# Patient Record
Sex: FEMALE | Race: WHITE | Employment: FULL TIME | URBAN - METROPOLITAN AREA
[De-identification: names, ages, dates, MRNs, and addresses within clinical notes are randomized per-mention and may not be internally consistent; named-entity substitution may affect disease eponyms.]

---

## 2017-06-05 ENCOUNTER — OFFICE VISIT (OUTPATIENT)
Dept: FAMILY MEDICINE CLINIC | Facility: CLINIC | Age: 37
End: 2017-06-05

## 2017-06-05 VITALS
RESPIRATION RATE: 20 BRPM | HEIGHT: 67 IN | BODY MASS INDEX: 22.26 KG/M2 | TEMPERATURE: 98.4 F | DIASTOLIC BLOOD PRESSURE: 78 MMHG | WEIGHT: 141.8 LBS | OXYGEN SATURATION: 99 % | SYSTOLIC BLOOD PRESSURE: 122 MMHG | HEART RATE: 70 BPM

## 2017-06-05 DIAGNOSIS — J02.9 SORE THROAT: ICD-10-CM

## 2017-06-05 DIAGNOSIS — J01.00 ACUTE NON-RECURRENT MAXILLARY SINUSITIS: Primary | ICD-10-CM

## 2017-06-05 LAB
S PYO AG THROAT QL: NEGATIVE
VALID INTERNAL CONTROL?: YES

## 2017-06-05 RX ORDER — AMOXICILLIN AND CLAVULANATE POTASSIUM 875; 125 MG/1; MG/1
1 TABLET, FILM COATED ORAL EVERY 12 HOURS
Qty: 14 TAB | Refills: 0 | Status: SHIPPED | OUTPATIENT
Start: 2017-06-05 | End: 2017-06-12

## 2017-06-05 RX ORDER — GUAIFENESIN 100 MG/5ML
200 SOLUTION ORAL
COMMUNITY

## 2017-06-05 RX ORDER — CETIRIZINE HCL 10 MG
TABLET ORAL
COMMUNITY

## 2017-06-05 RX ORDER — IBUPROFEN 200 MG
TABLET ORAL
COMMUNITY

## 2017-06-05 NOTE — PATIENT INSTRUCTIONS
Sinusitis: Care Instructions  Your Care Instructions    Sinusitis is an infection of the lining of the sinus cavities in your head. Sinusitis often follows a cold. It causes pain and pressure in your head and face. In most cases, sinusitis gets better on its own in 1 to 2 weeks. But some mild symptoms may last for several weeks. Sometimes antibiotics are needed. Follow-up care is a key part of your treatment and safety. Be sure to make and go to all appointments, and call your doctor if you are having problems. It's also a good idea to know your test results and keep a list of the medicines you take. How can you care for yourself at home? · Take an over-the-counter pain medicine, such as acetaminophen (Tylenol), ibuprofen (Advil, Motrin), or naproxen (Aleve). Read and follow all instructions on the label. · If the doctor prescribed antibiotics, take them as directed. Do not stop taking them just because you feel better. You need to take the full course of antibiotics. · Be careful when taking over-the-counter cold or flu medicines and Tylenol at the same time. Many of these medicines have acetaminophen, which is Tylenol. Read the labels to make sure that you are not taking more than the recommended dose. Too much acetaminophen (Tylenol) can be harmful. · Breathe warm, moist air from a steamy shower, a hot bath, or a sink filled with hot water. Avoid cold, dry air. Using a humidifier in your home may help. Follow the directions for cleaning the machine. · Use saline (saltwater) nasal washes to help keep your nasal passages open and wash out mucus and bacteria. You can buy saline nose drops at a grocery store or drugstore. Or you can make your own at home by adding 1 teaspoon of salt and 1 teaspoon of baking soda to 2 cups of distilled water. If you make your own, fill a bulb syringe with the solution, insert the tip into your nostril, and squeeze gently. Alex Ebbs your nose.   · Put a hot, wet towel or a warm gel pack on your face 3 or 4 times a day for 5 to 10 minutes each time. · Try a decongestant nasal spray like oxymetazoline (Afrin). Do not use it for more than 3 days in a row. Using it for more than 3 days can make your congestion worse. When should you call for help? Call your doctor now or seek immediate medical care if:  · You have new or worse swelling or redness in your face or around your eyes. · You have a new or higher fever. Watch closely for changes in your health, and be sure to contact your doctor if:  · You have new or worse facial pain. · The mucus from your nose becomes thicker (like pus) or has new blood in it. · You are not getting better as expected. Where can you learn more? Go to http://lali-josiah.info/. Enter F512 in the search box to learn more about \"Sinusitis: Care Instructions. \"  Current as of: July 29, 2016  Content Version: 11.2  © 7150-9187 [employer]ooma. Care instructions adapted under license by [employer] (which disclaims liability or warranty for this information). If you have questions about a medical condition or this instruction, always ask your healthcare professional. [place] [address] any warranty or liability for your use of this information. Saline Nasal Washes: Care Instructions  Your Care Instructions  Saline nasal washes help keep the nasal passages open by washing out thick or dried mucus. This simple remedy can help relieve symptoms of allergies, sinusitis, and colds. It also can make the nose feel more comfortable by keeping the mucous membranes moist. You may notice a little burning sensation in your nose the first few times you use the solution, but this usually gets better in a few days. Follow-up care is a key part of your treatment and safety. Be sure to make and go to all appointments, and call your doctor if you are having problems.  It's also a good idea to know your test results and keep a list of the medicines you take. How can you care for yourself at home? · You can buy premixed saline solution in a squeeze bottle or other sinus rinse products at a drugstore. Read and follow the instructions on the label. · You also can make your own saline solution by adding 1 teaspoon of salt and 1 teaspoon of baking soda to 2 cups of distilled water. · If you use a homemade solution, pour a small amount into a clean bowl. Using a rubber bulb syringe, squeeze the syringe and place the tip in the salt water. Pull a small amount of the salt water into the syringe by relaxing your hand. · Sit down with your head tilted slightly back. Do not lie down. Put the tip of the bulb syringe or the squeeze bottle a little way into one of your nostrils. Gently drip or squirt a few drops into the nostril. Repeat with the other nostril. Some sneezing and gagging are normal at first.  · Gently blow your nose. · Wipe the syringe or bottle tip clean after each use. · Repeat this 2 or 3 times a day. · Use nasal washes gently if you have nosebleeds often. When should you call for help? Watch closely for changes in your health, and be sure to contact your doctor if:  · You often get nosebleeds. · You have problems doing the nasal washes. Where can you learn more? Go to http://lali-josiah.info/. Enter [id number] in the search box to learn more about \"Saline Nasal Washes: Care Instructions. \"  Current as of: July 29, 2016  Content Version: 11.2  © 0961-6568 [employer]. Care instructions adapted under license by [employer] (which disclaims liability or warranty for this information). If you have questions about a medical condition or this instruction, always ask your healthcare professional. [address] any warranty or liability for your use of this information.

## 2017-06-05 NOTE — MR AVS SNAPSHOT
Visit Information Date & Time Provider Department Dept. Phone Encounter #  
 6/5/2017 10:30 AM Tomas Yung NP Chuck Lafle[facility] [address] [phone] [id number] Allergies as of 6/5/2017  Review Complete On: 6/5/2017 By: Tomas Yung NP No Known Allergies Current Immunizations  Never Reviewed No immunizations on file. Not reviewed this visit You Were Diagnosed With   
  
 Codes Comments Acute non-recurrent maxillary sinusitis    -  Primary ICD-10-CM: J01.00 ICD-9-CM: 461.0 Vitals BP Pulse Temp Resp Height(growth percentile) Weight(growth percentile) 122/78 (BP 1 Location: Right arm, BP Patient Position: Sitting) 70 98.4 °F (36.9 °C) (Oral) 20 5' 7\" (1.702 m) 141 lb 12.8 oz (64.3 kg) LMP SpO2 BMI Smoking Status 05/26/2017 (Exact Date) 99% 22.21 kg/m2 Never Smoker BMI and BSA Data Body Mass Index Body Surface Area  
 22.21 kg/m 2 1.74 m 2 Preferred Pharmacy Pharmacy Name Phone [id number] [address] [facility], [address] [place] [address]  [phone] Your Updated Medication List  
  
   
This list is accurate as of: 6/5/17 11:11 AM.  Always use your most recent med list.  
  
  
  
  
 amoxicillin-clavulanate 875-125 mg per tablet Commonly known as:  AUGMENTIN Take 1 Tab by mouth every twelve (12) hours for 7 days. guaiFENesin 100 mg/5 mL liquid Commonly known as:  ROBITUSSIN Take 200 mg by mouth three (3) times daily as needed for Cough. ibuprofen 200 mg tablet Commonly known as:  MOTRIN Take  by mouth. ZyrTEC 10 mg tablet Generic drug:  cetirizine Take  by mouth. Prescriptions Sent to Pharmacy Refills  
 amoxicillin-clavulanate (AUGMENTIN) 875-125 mg per tablet 0 Sig: Take 1 Tab by mouth every twelve (12) hours for 7 days. Class: Normal  
 [id number] [address] [facility], [address] [place] [address]  Ph #: [phone]  Route: Oral  
  
 Patient Instructions Sinusitis: Care Instructions Your Care Instructions Sinusitis is an infection of the lining of the sinus cavities in your head. Sinusitis often follows a cold. It causes pain and pressure in your head and face. In most cases, sinusitis gets better on its own in 1 to 2 weeks. But some mild symptoms may last for several weeks. Sometimes antibiotics are needed. Follow-up care is a key part of your treatment and safety. Be sure to make and go to all appointments, and call your doctor if you are having problems. It's also a good idea to know your test results and keep a list of the medicines you take. How can you care for yourself at home? · Take an over-the-counter pain medicine, such as acetaminophen (Tylenol), ibuprofen (Advil, Motrin), or naproxen (Aleve). Read and follow all instructions on the label. · If the doctor prescribed antibiotics, take them as directed. Do not stop taking them just because you feel better. You need to take the full course of antibiotics. · Be careful when taking over-the-counter cold or flu medicines and Tylenol at the same time. Many of these medicines have acetaminophen, which is Tylenol. Read the labels to make sure that you are not taking more than the recommended dose. Too much acetaminophen (Tylenol) can be harmful. · Breathe warm, moist air from a steamy shower, a hot bath, or a sink filled with hot water. Avoid cold, dry air. Using a humidifier in your home may help. Follow the directions for cleaning the machine. · Use saline (saltwater) nasal washes to help keep your nasal passages open and wash out mucus and bacteria. You can buy saline nose drops at a grocery store or drugstore. Or you can make your own at home by adding 1 teaspoon of salt and 1 teaspoon of baking soda to 2 cups of distilled water. If you make your own, fill a bulb syringe with the solution, insert the tip into your nostril, and squeeze gently. Simpson Fleischer your nose. · Put a hot, wet towel or a warm gel pack on your face 3 or 4 times a day for 5 to 10 minutes each time. · Try a decongestant nasal spray like oxymetazoline (Afrin). Do not use it for more than 3 days in a row. Using it for more than 3 days can make your congestion worse. When should you call for help? Call your doctor now or seek immediate medical care if: 
· You have new or worse swelling or redness in your face or around your eyes. · You have a new or higher fever. Watch closely for changes in your health, and be sure to contact your doctor if: 
· You have new or worse facial pain. · The mucus from your nose becomes thicker (like pus) or has new blood in it. · You are not getting better as expected. Where can you learn more? Go to http://lali-josiah.info/. Enter N940 in the search box to learn more about \"Sinusitis: Care Instructions. \" Current as of: July 29, 2016 Content Version: 11.2 © 2868-1309 Filtrbox. Care instructions adapted under license by SynapCell (which disclaims liability or warranty for this information). If you have questions about a medical condition or this instruction, always ask your healthcare professional. [address] any warranty or liability for your use of this information. Saline Nasal Washes: Care Instructions Your Care Instructions Saline nasal washes help keep the nasal passages open by washing out thick or dried mucus. This simple remedy can help relieve symptoms of allergies, sinusitis, and colds. It also can make the nose feel more comfortable by keeping the mucous membranes moist. You may notice a little burning sensation in your nose the first few times you use the solution, but this usually gets better in a few days. Follow-up care is a key part of your treatment and safety.  Be sure to make and go to all appointments, and call your doctor if you are having problems. It's also a good idea to know your test results and keep a list of the medicines you take. How can you care for yourself at home? · You can buy premixed saline solution in a squeeze bottle or other sinus rinse products at a drugstore. Read and follow the instructions on the label. · You also can make your own saline solution by adding 1 teaspoon of salt and 1 teaspoon of baking soda to 2 cups of distilled water. · If you use a homemade solution, pour a small amount into a clean bowl. Using a rubber bulb syringe, squeeze the syringe and place the tip in the salt water. Pull a small amount of the salt water into the syringe by relaxing your hand. · Sit down with your head tilted slightly back. Do not lie down. Put the tip of the bulb syringe or the squeeze bottle a little way into one of your nostrils. Gently drip or squirt a few drops into the nostril. Repeat with the other nostril. Some sneezing and gagging are normal at first. 
· Gently blow your nose. · Wipe the syringe or bottle tip clean after each use. · Repeat this 2 or 3 times a day. · Use nasal washes gently if you have nosebleeds often. When should you call for help? Watch closely for changes in your health, and be sure to contact your doctor if: 
· You often get nosebleeds. · You have problems doing the nasal washes. Where can you learn more? Go to http://lali-josiah.info/. Enter [id number] [phone] in the search box to learn more about \"Saline Nasal Washes: Care Instructions. \" Current as of: July 29, 2016 Content Version: 11.2 © 1566-1046 Familybuilder. Care instructions adapted under license by Appscio (which disclaims liability or warranty for this information). If you have questions about a medical condition or this instruction, always ask your healthcare professional. [address] any warranty or liability for your use of this information. Introducing [facility]! [facility] introduces AccelOne patient portal. Now you can access parts of your medical record, email your doctor's office, and request medication refills online. 1. In your internet browser, go to [email or web address] 2. Click on the First Time User? Click Here link in the Sign In box. You will see the New Member Sign Up page. 3. Enter your AccelOne Access Code exactly as it appears below. You will not need to use this code after youve completed the sign-up process. If you do not sign up before the expiration date, you must request a new code. · AccelOne Access Code: [email or web address] Expires: 9/3/2017 11:08 AM 
 
4. Enter the last four digits of your Social Security Number (xxxx) and Date of Birth (mm/dd/yyyy) as indicated and click Submit. You will be taken to the next sign-up page. 5. Create a AccelOne ID. This will be your AccelOne login ID and cannot be changed, so think of one that is secure and easy to remember. 6. Create a AccelOne password. You can change your password at any time. 7. Enter your Password Reset Question and Answer. This can be used at a later time if you forget your password. 8. Enter your e-mail address. You will receive e-mail notification when new information is available in [address] 9. Click Sign Up. You can now view and download portions of your medical record. 10. Click the Download Summary menu link to download a portable copy of your medical information. If you have questions, please visit the Frequently Asked Questions section of the AccelOne website. Remember, AccelOne is NOT to be used for urgent needs. For medical emergencies, dial 911. Now available from your iPhone and Android! Please provide this summary of care documentation to your next provider. If you have any questions after today's visit, please call [phone].

## 2017-06-05 NOTE — PROGRESS NOTES
Chief Complaint   Patient presents with    Pressure Behind the Eyes     Sinus pressure,painand tenderness with congestion around the cheek area for 10 days andsore throat.  Sinus Pain    Sore Throat     HISTORY OF PRESENT ILLNESS  Georgiana Lugo is a 42 y.o. female who presents with sinus pain and pressure in the cheek area for 10 days. Additional associated symptoms include postnasal drip and sore throat. Denies fever. Pressure Behind the Eyes    The history is provided by the patient. This is a new problem. The current episode started more than 1 week ago. The problem has been gradually worsening. There has been no fever. The pain is at a severity of 6/10. The pain is moderate. The pain has been fluctuating since onset. Associated symptoms include congestion, sinus pressure, sore throat, cough, rhinorrhea and headaches. Pertinent negatives include no chills, no sweats, no ear pain, no hoarse voice, no swollen glands, no shortness of breath, no neck pain, no neck pain and no chest pain. She has tried other meds for the symptoms. The treatment provided mild relief. Sinus Pain    Associated symptoms include congestion, sinus pressure, sore throat, cough, rhinorrhea and headaches. Pertinent negatives include no chills, no sweats, no ear pain, no hoarse voice, no swollen glands, no shortness of breath, no neck pain, no neck pain and no chest pain. Sore Throat    Associated symptoms include congestion, headaches and cough. Pertinent negatives include no ear pain, no shortness of breath and no swollen glands. Review of Systems   Constitutional: Negative for chills, fever and malaise/fatigue. HENT: Positive for congestion, rhinorrhea, sinus pressure and sore throat. Negative for ear pain and hoarse voice. Sinus pain and pressure   Eyes: Negative for discharge and redness. Respiratory: Positive for cough. Negative for hemoptysis, sputum production, shortness of breath and wheezing. Cardiovascular: Negative for chest pain. Musculoskeletal: Negative for neck pain. Neurological: Positive for headaches. History reviewed. No pertinent past medical history. Family History   Problem Relation Age of Onset    Thyroid Disease Mother     No Known Problems Father      Social History     Social History    Marital status:      Spouse name: N/A    Number of children: N/A    Years of education: N/A     Occupational History    Not on file. Social History Main Topics    Smoking status: Never Smoker    Smokeless tobacco: Not on file    Alcohol use 1.2 oz/week     2 Glasses of wine per week    Drug use: No    Sexual activity: Yes     Other Topics Concern    Not on file     Social History Narrative    No narrative on file       Current Outpatient Prescriptions:     guaiFENesin (ROBITUSSIN) 100 mg/5 mL liquid, Take 200 mg by mouth three (3) times daily as needed for Cough. , Disp: , Rfl:     cetirizine (ZYRTEC) 10 mg tablet, Take  by mouth., Disp: , Rfl:     ibuprofen (MOTRIN) 200 mg tablet, Take  by mouth., Disp: , Rfl:     amoxicillin-clavulanate (AUGMENTIN) 875-125 mg per tablet, Take 1 Tab by mouth every twelve (12) hours for 7 days. , Disp: 14 Tab, Rfl: 0    Objective:   Visit Vitals    /78 (BP 1 Location: Right arm, BP Patient Position: Sitting)    Pulse 70    Temp 98.4 °F (36.9 °C) (Oral)    Resp 20    Ht 5' 7\" (1.702 m)    Wt 141 lb 12.8 oz (64.3 kg)    LMP 05/26/2017 (Exact Date)    SpO2 99%    BMI 22.21 kg/m2     Physical Exam   Constitutional: She is oriented to person, place, and time. She appears well-developed and well-nourished. HENT:   Head: Normocephalic and atraumatic. Right Ear: External ear normal.   Mouth/Throat: Oropharynx is clear and moist. No oropharyngeal exudate. Rhinitis with maxillary tenderness on palpation. Left ear with mild erythema. No drainage or reported pain by patient.  TM non-bulging; cloudy appearance of the middle ear.   Eyes: Conjunctivae are normal.   Cardiovascular: Normal rate, regular rhythm and normal heart sounds. Pulmonary/Chest: Effort normal and breath sounds normal. No respiratory distress. She has no wheezes. She has no rales. She exhibits no tenderness. Lymphadenopathy:     She has no cervical adenopathy. Neurological: She is alert and oriented to person, place, and time. Skin: Skin is warm and dry. Psychiatric: She has a normal mood and affect. ASSESSMENT and PLAN    ICD-10-CM ICD-9-CM    1. Acute non-recurrent maxillary sinusitis J01.00 461.0 amoxicillin-clavulanate (AUGMENTIN) 875-125 mg per tablet   2. Sore throat J02.9 462 AMB POC RAPID STREP A     Lab results discussed and reviewed with patient. Rapid strep test is negative. Reviewed medications,s/s allergic raction and side effects in detail. Instructed warm compresses to the sinuses, sinus rinses and/or nasal spray, use of humidifier, and continue Ibuprofen and Zyrtec. Advised if symptoms do not improve in 3-5 days, or worsen prior, to f/u with PCP, or seek further medical evaluation. After visit summary discussed with and given to patient who verbalized understanding and was given the opportunity to ask questions.